# Patient Record
Sex: FEMALE | Race: WHITE | NOT HISPANIC OR LATINO
[De-identification: names, ages, dates, MRNs, and addresses within clinical notes are randomized per-mention and may not be internally consistent; named-entity substitution may affect disease eponyms.]

---

## 2019-08-20 ENCOUNTER — FORM ENCOUNTER (OUTPATIENT)
Age: 59
End: 2019-08-20

## 2019-09-16 ENCOUNTER — FORM ENCOUNTER (OUTPATIENT)
Age: 59
End: 2019-09-16

## 2020-02-06 ENCOUNTER — TRANSCRIPTION ENCOUNTER (OUTPATIENT)
Age: 60
End: 2020-02-06

## 2020-06-04 ENCOUNTER — TRANSCRIPTION ENCOUNTER (OUTPATIENT)
Age: 60
End: 2020-06-04

## 2020-08-24 ENCOUNTER — FORM ENCOUNTER (OUTPATIENT)
Age: 60
End: 2020-08-24

## 2020-09-14 ENCOUNTER — FORM ENCOUNTER (OUTPATIENT)
Age: 60
End: 2020-09-14

## 2021-08-23 PROBLEM — Z00.00 ENCOUNTER FOR PREVENTIVE HEALTH EXAMINATION: Status: ACTIVE | Noted: 2021-08-23

## 2021-08-31 DIAGNOSIS — Z87.19 PERSONAL HISTORY OF OTHER DISEASES OF THE DIGESTIVE SYSTEM: ICD-10-CM

## 2021-08-31 DIAGNOSIS — Z87.891 PERSONAL HISTORY OF NICOTINE DEPENDENCE: ICD-10-CM

## 2021-08-31 DIAGNOSIS — Z86.39 PERSONAL HISTORY OF OTHER ENDOCRINE, NUTRITIONAL AND METABOLIC DISEASE: ICD-10-CM

## 2021-08-31 DIAGNOSIS — Z78.9 OTHER SPECIFIED HEALTH STATUS: ICD-10-CM

## 2021-09-01 ENCOUNTER — APPOINTMENT (OUTPATIENT)
Dept: BREAST CENTER | Facility: CLINIC | Age: 61
End: 2021-09-01
Payer: COMMERCIAL

## 2021-09-01 VITALS — HEIGHT: 61 IN | WEIGHT: 150 LBS | BODY MASS INDEX: 28.32 KG/M2

## 2021-09-01 DIAGNOSIS — Z80.6 FAMILY HISTORY OF LEUKEMIA: ICD-10-CM

## 2021-09-01 DIAGNOSIS — M72.2 PLANTAR FASCIAL FIBROMATOSIS: ICD-10-CM

## 2021-09-01 PROCEDURE — 99214 OFFICE O/P EST MOD 30 MIN: CPT

## 2021-09-01 RX ORDER — ATORVASTATIN CALCIUM 10 MG/1
10 TABLET, FILM COATED ORAL
Refills: 0 | Status: ACTIVE | COMMUNITY

## 2021-09-01 RX ORDER — CALCIUM CARBONATE 600 MG
TABLET ORAL
Refills: 0 | Status: ACTIVE | COMMUNITY

## 2021-09-01 RX ORDER — PANTOPRAZOLE 40 MG/1
40 TABLET, DELAYED RELEASE ORAL
Refills: 0 | Status: ACTIVE | COMMUNITY

## 2021-09-01 RX ORDER — ROSUVASTATIN CALCIUM 5 MG/1
5 TABLET, FILM COATED ORAL
Refills: 0 | Status: DISCONTINUED | COMMUNITY
End: 2021-09-01

## 2021-09-01 NOTE — PHYSICAL EXAM
[Normocephalic] : normocephalic [Atraumatic] : atraumatic [EOMI] : extra ocular movement intact [Supple] : supple [No Supraclavicular Adenopathy] : no supraclavicular adenopathy [No Cervical Adenopathy] : no cervical adenopathy [Examined in the supine and seated position] : examined in the supine and seated position [No dominant masses] : no dominant masses in right breast  [No dominant masses] : no dominant masses left breast [No Nipple Retraction] : no left nipple retraction [No Nipple Discharge] : no left nipple discharge [Breast Mass Right Breast ___cm] : no masses [Breast Mass Left Breast ___cm] : no masses [Breast Nipple Inversion] : nipples not inverted [Breast Nipple Retraction] : nipples not retracted [Breast Nipple Flattening] : nipples not flattened [Breast Nipple Fissures] : nipples not fissured [Breast Abnormal Lactation (Galactorrhea)] : no galactorrhea [Breast Abnormal Secretion Serous Fluid] : no serous discharge [Breast Abnormal Secretion Bloody Fluid] : no bloody discharge [Breast Abnormal Secretion Opalescent Fluid] : no milky discharge [No Axillary Lymphadenopathy] : no left axillary lymphadenopathy [No Edema] : no edema [No Rashes] : no rashes [No Ulceration] : no ulceration [de-identified] : On exam, the patient has C-cup breasts.  On palpation, I cannot feel any suspicious densities in either breast.  She has no axillary, supraclavicular, or cervical adenopathy.

## 2021-09-01 NOTE — HISTORY OF PRESENT ILLNESS
[FreeTextEntry1] : The patient is a 61-year-old G2, P2 postmenopausal white female with Hong Konger and some Polish heritage.  She underwent menarche at age 14 and had her first child at age 29.  She underwent menopause at age 52 and never took any hormone replacement therapy.  She is a previous history of 2 right breast biopsies in her 20s which were benign.  She has no family history of breast or ovarian cancer.  The patient was seen in 2014 with a questionable left breast mass which turned out to be fibrocystic change and diagnostic studies were negative.  She comes in for routine yearly follow-up with a history of fibrocystic mastopathy and gets yearly mammography and ultrasound.

## 2021-09-01 NOTE — REASON FOR VISIT
[Follow-Up: _____] : a [unfilled] follow-up visit [FreeTextEntry1] : The patient comes in with a history of fibrocystic mastopathy for yearly clinical exams and gets yearly mammography and ultrasound.

## 2021-09-01 NOTE — ASSESSMENT
[FreeTextEntry1] : The patient is a 61-year-old G2, P2 postmenopausal white female with Angolan and some Polish heritage.  She underwent menarche at age 14 and had her first child at age 29.  She underwent menopause at age 52 and never took any hormone replacement therapy.  She is a previous history of 2 right breast biopsies in her 20s which were benign.  She has no family history of breast or ovarian cancer.  The patient was seen in 2014 with a questionable left breast mass which turned out to be fibrocystic change and diagnostic studies were negative.  The patient underwent her last mammography and ultrasound on August 26, 2021 at [place] which showed no suspicious findings.  On exam today, I cannot feel any suspicious densities in either breast.  The patient was reassured and should follow-up again in 1 year and is given a prescription for her next bilateral mammography and ultrasound again in August 2022.

## 2022-08-29 ENCOUNTER — RESULT REVIEW (OUTPATIENT)
Age: 62
End: 2022-08-29

## 2022-08-29 ENCOUNTER — APPOINTMENT (OUTPATIENT)
Dept: BREAST CENTER | Facility: CLINIC | Age: 62
End: 2022-08-29

## 2022-08-29 VITALS
WEIGHT: 142 LBS | OXYGEN SATURATION: 99 % | HEIGHT: 61 IN | HEART RATE: 85 BPM | SYSTOLIC BLOOD PRESSURE: 132 MMHG | DIASTOLIC BLOOD PRESSURE: 86 MMHG | BODY MASS INDEX: 26.81 KG/M2

## 2022-08-29 PROCEDURE — 99213 OFFICE O/P EST LOW 20 MIN: CPT

## 2022-08-29 NOTE — ASSESSMENT
[FreeTextEntry1] : The patient is a 62-year-old G2, P2 postmenopausal white female with Singaporean and some Polish heritage.  She underwent menarche at age 14 and had her first child at age 29.  She underwent menopause at age 52 and never took any hormone replacement therapy.  She is a previous history of 2 right breast biopsies in her 20s which were benign.  She has no family history of breast or ovarian cancer.  The patient was seen in 2014 with a questionable left breast mass which turned out to be fibrocystic change and diagnostic studies were negative.  The patient underwent her last mammography and ultrasound which was reviewed from today on August 29, 2022 performed here at [place] which showed no suspicious findings.  On exam today, I cannot feel any suspicious densities in either breast.  The patient was reassured and should follow-up again in 1 year and was given a prescription for her next bilateral mammography and ultrasound due in August 2023.

## 2022-08-29 NOTE — HISTORY OF PRESENT ILLNESS
[FreeTextEntry1] : The patient is a 62-year-old G2, P2 postmenopausal white female with Marshallese and some Polish heritage.  She underwent menarche at age 14 and had her first child at age 29.  She underwent menopause at age 52 and never took any hormone replacement therapy.  She is a previous history of 2 right breast biopsies in her 20s which were benign.  She has no family history of breast or ovarian cancer.  The patient was seen in 2014 with a questionable left breast mass which turned out to be fibrocystic change and diagnostic studies were negative.  She comes in for routine yearly follow-up with a history of fibrocystic mastopathy and gets yearly mammography and ultrasound.

## 2022-08-29 NOTE — PHYSICAL EXAM
[Normocephalic] : normocephalic [Atraumatic] : atraumatic [EOMI] : extra ocular movement intact [Supple] : supple [No Supraclavicular Adenopathy] : no supraclavicular adenopathy [No Cervical Adenopathy] : no cervical adenopathy [Examined in the supine and seated position] : examined in the supine and seated position [No dominant masses] : no dominant masses in right breast  [No dominant masses] : no dominant masses left breast [No Nipple Retraction] : no left nipple retraction [No Nipple Discharge] : no left nipple discharge [Breast Mass Right Breast ___cm] : no masses [Breast Mass Left Breast ___cm] : no masses [No Axillary Lymphadenopathy] : no left axillary lymphadenopathy [No Edema] : no edema [No Rashes] : no rashes [No Ulceration] : no ulceration [Breast Nipple Inversion] : nipples not inverted [Breast Nipple Retraction] : nipples not retracted [Breast Nipple Flattening] : nipples not flattened [Breast Nipple Fissures] : nipples not fissured [Breast Abnormal Lactation (Galactorrhea)] : no galactorrhea [Breast Abnormal Secretion Bloody Fluid] : no bloody discharge [Breast Abnormal Secretion Serous Fluid] : no serous discharge [Breast Abnormal Secretion Opalescent Fluid] : no milky discharge [de-identified] : On exam, the patient has C-cup breasts.  On palpation, I cannot feel any suspicious densities in either breast.  She has no axillary, supraclavicular, or cervical adenopathy.

## 2023-08-30 ENCOUNTER — RESULT REVIEW (OUTPATIENT)
Age: 63
End: 2023-08-30

## 2023-08-30 ENCOUNTER — APPOINTMENT (OUTPATIENT)
Dept: BREAST CENTER | Facility: CLINIC | Age: 63
End: 2023-08-30
Payer: COMMERCIAL

## 2023-08-30 VITALS — BODY MASS INDEX: 26.81 KG/M2 | WEIGHT: 142 LBS | HEIGHT: 61 IN

## 2023-08-30 DIAGNOSIS — R92.2 INCONCLUSIVE MAMMOGRAM: ICD-10-CM

## 2023-08-30 DIAGNOSIS — N60.12 DIFFUSE CYSTIC MASTOPATHY OF LEFT BREAST: ICD-10-CM

## 2023-08-30 DIAGNOSIS — N60.11 DIFFUSE CYSTIC MASTOPATHY OF LEFT BREAST: ICD-10-CM

## 2023-08-30 DIAGNOSIS — D24.2 BENIGN NEOPLASM OF LEFT BREAST: ICD-10-CM

## 2023-08-30 PROCEDURE — 99213 OFFICE O/P EST LOW 20 MIN: CPT

## 2023-08-30 NOTE — ASSESSMENT
[FreeTextEntry1] : The patient is a 63-year-old G2, P2 postmenopausal white female with Vincentian and some Polish heritage.  She underwent menarche at age 14 and had her first child at age 29.  She underwent menopause at age 52 and never took any hormone replacement therapy.  She has a previous history of 2 right breast biopsies in her 20s which were benign.  She has no family history of breast or ovarian cancer.  The patient was seen in 2014 with a questionable left breast mass which turned out to be fibrocystic change and diagnostic studies were negative.  She did undergo a benign left 11:00 breast needle core biopsy in August 2018 which was benign.  The patient underwent her last mammography and ultrasound which was reviewed from today on August 30, 2022 performed here at [facility] which showed no suspicious findings.  On exam today, I cannot feel any suspicious densities in either breast.  The patient was reassured and should follow-up again in 1 year and was given a prescription for her next bilateral mammography and ultrasound due in August 2024.

## 2023-08-30 NOTE — PHYSICAL EXAM
[Normocephalic] : normocephalic [Atraumatic] : atraumatic [EOMI] : extra ocular movement intact [Supple] : supple [No Cervical Adenopathy] : no cervical adenopathy [No Supraclavicular Adenopathy] : no supraclavicular adenopathy [Examined in the supine and seated position] : examined in the supine and seated position [No dominant masses] : no dominant masses in right breast  [No dominant masses] : no dominant masses left breast [No Nipple Retraction] : no left nipple retraction [No Nipple Discharge] : no left nipple discharge [Breast Mass Right Breast ___cm] : no masses [Breast Mass Left Breast ___cm] : no masses [No Axillary Lymphadenopathy] : no left axillary lymphadenopathy [No Edema] : no edema [No Rashes] : no rashes [No Ulceration] : no ulceration [Breast Nipple Inversion] : nipples not inverted [Breast Nipple Retraction] : nipples not retracted [Breast Nipple Flattening] : nipples not flattened [Breast Nipple Fissures] : nipples not fissured [Breast Abnormal Lactation (Galactorrhea)] : no galactorrhea [Breast Abnormal Secretion Bloody Fluid] : no bloody discharge [Breast Abnormal Secretion Serous Fluid] : no serous discharge [Breast Abnormal Secretion Opalescent Fluid] : no milky discharge [de-identified] : On exam, the patient has C-cup breasts.  On palpation, I cannot feel any suspicious densities in either breast.  She has no axillary, supraclavicular, or cervical adenopathy.

## 2023-08-30 NOTE — HISTORY OF PRESENT ILLNESS
[FreeTextEntry1] : The patient is a 63-year-old G2, P2 postmenopausal white female with American and some Polish heritage.  She underwent menarche at age 14 and had her first child at age 29.  She underwent menopause at age 52 and never took any hormone replacement therapy.  She has a previous history of 2 right breast biopsies in her 20s which were benign.  She has no family history of breast or ovarian cancer.  The patient was seen in 2014 with a questionable left breast mass which turned out to be fibrocystic change and diagnostic studies were negative.  She comes in for routine yearly follow-up with a history of fibrocystic mastopathy and gets yearly mammography and ultrasound.

## 2024-09-16 ENCOUNTER — APPOINTMENT (OUTPATIENT)
Dept: BREAST CENTER | Facility: CLINIC | Age: 64
End: 2024-09-16
Payer: COMMERCIAL

## 2024-09-16 ENCOUNTER — RESULT REVIEW (OUTPATIENT)
Age: 64
End: 2024-09-16

## 2024-09-16 VITALS — WEIGHT: 141 LBS | BODY MASS INDEX: 26.62 KG/M2 | HEIGHT: 61 IN

## 2024-09-16 DIAGNOSIS — N60.11 DIFFUSE CYSTIC MASTOPATHY OF LEFT BREAST: ICD-10-CM

## 2024-09-16 DIAGNOSIS — Z12.31 ENCOUNTER FOR SCREENING MAMMOGRAM FOR MALIGNANT NEOPLASM OF BREAST: ICD-10-CM

## 2024-09-16 DIAGNOSIS — N60.12 DIFFUSE CYSTIC MASTOPATHY OF LEFT BREAST: ICD-10-CM

## 2024-09-16 DIAGNOSIS — D24.2 BENIGN NEOPLASM OF LEFT BREAST: ICD-10-CM

## 2024-09-16 PROCEDURE — 99212 OFFICE O/P EST SF 10 MIN: CPT

## 2024-09-16 NOTE — ASSESSMENT
[FreeTextEntry1] : The patient is a 64-year-old G2, P2 postmenopausal white female with German and some Polish heritage.  She underwent menarche at age 14 and had her first child at age 29.  She underwent menopause at age 52 and never took any hormone replacement therapy.  She has a previous history of 2 right breast biopsies in her 20s which were benign.  She has no family history of breast or ovarian cancer.  The patient was seen in 2014 with a questionable left breast mass which turned out to be fibrocystic change and diagnostic studies were negative.  She did undergo a benign left 11:00 breast needle core biopsy in August 2018 which was benign.  The patient underwent her last mammography and ultrasound which was reviewed from today on September 16, 2024 performed here at [place] which showed no suspicious findings.  On exam today, I cannot feel any suspicious densities in either breast.  The patient was reassured and should follow-up again in 1 year and was given a prescription for her next bilateral mammography and ultrasound due in September 2025.

## 2024-09-16 NOTE — HISTORY OF PRESENT ILLNESS
[FreeTextEntry1] : The patient is a 64-year-old G2, P2 postmenopausal white female with Lao and some Polish heritage.  She underwent menarche at age 14 and had her first child at age 29.  She underwent menopause at age 52 and never took any hormone replacement therapy.  She has a previous history of 2 right breast biopsies in her 20s which were benign.  She has no family history of breast or ovarian cancer.  The patient was seen in 2014 with a questionable left breast mass which turned out to be fibrocystic change and diagnostic studies were negative.  She comes in for routine yearly follow-up with a history of fibrocystic mastopathy and gets yearly mammography and ultrasound.

## 2024-09-16 NOTE — PHYSICAL EXAM
[Normocephalic] : normocephalic [Atraumatic] : atraumatic [EOMI] : extra ocular movement intact [Supple] : supple [No Supraclavicular Adenopathy] : no supraclavicular adenopathy [No Cervical Adenopathy] : no cervical adenopathy [Examined in the supine and seated position] : examined in the supine and seated position [No dominant masses] : no dominant masses in right breast  [No dominant masses] : no dominant masses left breast [No Nipple Retraction] : no left nipple retraction [No Nipple Discharge] : no left nipple discharge [Breast Mass Right Breast ___cm] : no masses [Breast Mass Left Breast ___cm] : no masses [No Axillary Lymphadenopathy] : no left axillary lymphadenopathy [No Edema] : no edema [No Rashes] : no rashes [No Ulceration] : no ulceration [Breast Nipple Inversion] : nipples not inverted [Breast Nipple Retraction] : nipples not retracted [Breast Nipple Flattening] : nipples not flattened [Breast Nipple Fissures] : nipples not fissured [Breast Abnormal Lactation (Galactorrhea)] : no galactorrhea [Breast Abnormal Secretion Bloody Fluid] : no bloody discharge [Breast Abnormal Secretion Opalescent Fluid] : no milky discharge [Breast Abnormal Secretion Serous Fluid] : no serous discharge [de-identified] : On exam, the patient has C-cup breasts.  On palpation, I cannot feel any suspicious densities in either breast.  She has no axillary, supraclavicular, or cervical adenopathy.

## 2024-09-16 NOTE — ASSESSMENT
[FreeTextEntry1] : The patient is a 64-year-old G2, P2 postmenopausal white female with Slovenian and some Polish heritage.  She underwent menarche at age 14 and had her first child at age 29.  She underwent menopause at age 52 and never took any hormone replacement therapy.  She has a previous history of 2 right breast biopsies in her 20s which were benign.  She has no family history of breast or ovarian cancer.  The patient was seen in 2014 with a questionable left breast mass which turned out to be fibrocystic change and diagnostic studies were negative.  She did undergo a benign left 11:00 breast needle core biopsy in August 2018 which was benign.  The patient underwent her last mammography and ultrasound which was reviewed from today on September 16, 2024 performed here at [place] which showed no suspicious findings.  On exam today, I cannot feel any suspicious densities in either breast.  The patient was reassured and should follow-up again in 1 year and was given a prescription for her next bilateral mammography and ultrasound due in September 2025.

## 2024-09-16 NOTE — PAST MEDICAL HISTORY
Birth Control Pills Counseling: Birth Control Pill Counseling: I discussed with the patient the potential side effects of OCPs including but not limited to increased risk of stroke, heart attack, thrombophlebitis, deep venous thrombosis, hepatic adenomas, breast changes, GI upset, headaches, and depression.  The patient verbalized understanding of the proper use and possible adverse effects of OCPs. All of the patient's questions and concerns were addressed. Spoke with Hrei UREÑA at [facility] called report informed her of the  time scheduled at 19:30. Expecting patient to return to the unit at any moment spoke to Dialysis patient's vs are stable . He is to be discharged this evening.   Topical Retinoid Pregnancy And Lactation Text: This medication is Pregnancy Category C. It is unknown if this medication is excreted in breast milk. [Postmenopausal] : The patient is postmenopausal Isotretinoin Pregnancy And Lactation Text: This medication is Pregnancy Category X and is considered extremely dangerous during pregnancy. It is unknown if it is excreted in breast milk. [Menarche Age ____] : age at menarche was [unfilled] Topical Clindamycin Pregnancy And Lactation Text: This medication is Pregnancy Category B and is considered safe during pregnancy. It is unknown if it is excreted in breast milk. [Menopause Age____] : age at menopause was [unfilled] Minocycline Pregnancy And Lactation Text: This medication is Pregnancy Category D and not consider safe during pregnancy. It is also excreted in breast milk. [Total Preg ___] : G[unfilled] Doxycycline Counseling:  Patient counseled regarding possible photosensitivity and increased risk for sunburn.  Patient instructed to avoid sunlight, if possible.  When exposed to sunlight, patients should wear protective clothing, sunglasses, and sunscreen.  The patient was instructed to call the office immediately if the following severe adverse effects occur:  hearing changes, easy bruising/bleeding, severe headache, or vision changes.  The patient verbalized understanding of the proper use and possible adverse effects of doxycycline.  All of the patient's questions and concerns were addressed. [Live Births ___] : P[unfilled]  [Age At Live Birth ___] : Age at live birth: [unfilled] Azelaic Acid Counseling: Patient counseled that medicine may cause skin irritation and to avoid applying near the eyes.  In the event of skin irritation, the patient was advised to reduce the amount of the drug applied or use it less frequently.   The patient verbalized understanding of the proper use and possible adverse effects of azelaic acid.  All of the patient's questions and concerns were addressed. [History of Hormone Replacement Treatment] : has no history of hormone replacement treatment Spironolactone Pregnancy And Lactation Text: This medication can cause feminization of the male fetus and should be avoided during pregnancy. The active metabolite is also found in breast milk. Include Pregnancy/Lactation Warning?: No Topical Retinoid counseling:  Patient advised to apply a pea-sized amount only at bedtime and wait 30 minutes after washing their face before applying.  If too drying, patient may add a non-comedogenic moisturizer. The patient verbalized understanding of the proper use and possible adverse effects of retinoids.  All of the patient's questions and concerns were addressed. Isotretinoin Counseling: Patient should get monthly blood tests, not donate blood, not drive at night if vision affected, not share medication, and not undergo elective surgery for 6 months after tx completed. Side effects reviewed, pt to contact office should one occur. Winlevi Pregnancy And Lactation Text: This medication is considered safe during pregnancy and breastfeeding. Bactrim Pregnancy And Lactation Text: This medication is Pregnancy Category D and is known to cause fetal risk.  It is also excreted in breast milk. Topical Clindamycin Counseling: Patient counseled that this medication may cause skin irritation or allergic reactions.  In the event of skin irritation, the patient was advised to reduce the amount of the drug applied or use it less frequently.   The patient verbalized understanding of the proper use and possible adverse effects of clindamycin.  All of the patient's questions and concerns were addressed. Detail Level: Zone Minocycline Counseling: Patient advised regarding possible photosensitivity and discoloration of the teeth, skin, lips, tongue and gums.  Patient instructed to avoid sunlight, if possible.  When exposed to sunlight, patients should wear protective clothing, sunglasses, and sunscreen.  The patient was instructed to call the office immediately if the following severe adverse effects occur:  hearing changes, easy bruising/bleeding, severe headache, or vision changes.  The patient verbalized understanding of the proper use and possible adverse effects of minocycline.  All of the patient's questions and concerns were addressed. Aklief Pregnancy And Lactation Text: It is unknown if this medication is safe to use during pregnancy.  It is unknown if this medication is excreted in breast milk.  Breastfeeding women should use the topical cream on the smallest area of the skin for the shortest time needed while breastfeeding.  Do not apply to nipple and areola. Dapsone Pregnancy And Lactation Text: This medication is Pregnancy Category C and is not considered safe during pregnancy or breast feeding. Erythromycin Pregnancy And Lactation Text: This medication is Pregnancy Category B and is considered safe during pregnancy. It is also excreted in breast milk. Benzoyl Peroxide Pregnancy And Lactation Text: This medication is Pregnancy Category C. It is unknown if benzoyl peroxide is excreted in breast milk. Winlevi Counseling:  I discussed with the patient the risks of topical clascoterone including but not limited to erythema, scaling, itching, and stinging. Patient voiced their understanding. Spironolactone Counseling: Patient advised regarding risks of diarrhea, abdominal pain, hyperkalemia, birth defects (for female patients), liver toxicity and renal toxicity. The patient may need blood work to monitor liver and kidney function and potassium levels while on therapy. The patient verbalized understanding of the proper use and possible adverse effects of spironolactone.  All of the patient's questions and concerns were addressed. Bactrim Counseling:  I discussed with the patient the risks of sulfa antibiotics including but not limited to GI upset, allergic reaction, drug rash, diarrhea, dizziness, photosensitivity, and yeast infections.  Rarely, more serious reactions can occur including but not limited to aplastic anemia, agranulocytosis, methemoglobinemia, blood dyscrasias, liver or kidney failure, lung infiltrates or desquamative/blistering drug rashes. High Dose Vitamin A Pregnancy And Lactation Text: High dose vitamin A therapy is contraindicated during pregnancy and breast feeding. Aklief counseling:  Patient advised to apply a pea-sized amount only at bedtime and wait 30 minutes after washing their face before applying.  If too drying, patient may add a non-comedogenic moisturizer.  The most commonly reported side effects including irritation, redness, scaling, dryness, stinging, burning, itching, and increased risk of sunburn.  The patient verbalized understanding of the proper use and possible adverse effects of retinoids.  All of the patient's questions and concerns were addressed. Dapsone Counseling: I discussed with the patient the risks of dapsone including but not limited to hemolytic anemia, agranulocytosis, rashes, methemoglobinemia, kidney failure, peripheral neuropathy, headaches, GI upset, and liver toxicity.  Patients who start dapsone require monitoring including baseline LFTs and weekly CBCs for the first month, then every month thereafter.  The patient verbalized understanding of the proper use and possible adverse effects of dapsone.  All of the patient's questions and concerns were addressed. Tazorac Pregnancy And Lactation Text: This medication is not safe during pregnancy. It is unknown if this medication is excreted in breast milk. Benzoyl Peroxide Counseling: Patient counseled that medicine may cause skin irritation and bleach clothing.  In the event of skin irritation, the patient was advised to reduce the amount of the drug applied or use it less frequently.   The patient verbalized understanding of the proper use and possible adverse effects of benzoyl peroxide.  All of the patient's questions and concerns were addressed. Topical Sulfur Applications Pregnancy And Lactation Text: This medication is Pregnancy Category C and has an unknown safety profile during pregnancy. It is unknown if this topical medication is excreted in breast milk. Erythromycin Counseling:  I discussed with the patient the risks of erythromycin including but not limited to GI upset, allergic reaction, drug rash, diarrhea, increase in liver enzymes, and yeast infections. Azithromycin Pregnancy And Lactation Text: This medication is considered safe during pregnancy and is also secreted in breast milk. Birth Control Pills Pregnancy And Lactation Text: This medication should be avoided if pregnant and for the first 30 days post-partum. High Dose Vitamin A Counseling: Side effects reviewed, pt to contact office should one occur. Tazorac Counseling:  Patient advised that medication is irritating and drying.  Patient may need to apply sparingly and wash off after an hour before eventually leaving it on overnight.  The patient verbalized understanding of the proper use and possible adverse effects of tazorac.  All of the patient's questions and concerns were addressed. Topical Sulfur Applications Counseling: Topical Sulfur Counseling: Patient counseled that this medication may cause skin irritation or allergic reactions.  In the event of skin irritation, the patient was advised to reduce the amount of the drug applied or use it less frequently.   The patient verbalized understanding of the proper use and possible adverse effects of topical sulfur application.  All of the patient's questions and concerns were addressed. Azithromycin Counseling:  I discussed with the patient the risks of azithromycin including but not limited to GI upset, allergic reaction, drug rash, diarrhea, and yeast infections. Sarecycline Counseling: Patient advised regarding possible photosensitivity and discoloration of the teeth, skin, lips, tongue and gums.  Patient instructed to avoid sunlight, if possible.  When exposed to sunlight, patients should wear protective clothing, sunglasses, and sunscreen.  The patient was instructed to call the office immediately if the following severe adverse effects occur:  hearing changes, easy bruising/bleeding, severe headache, or vision changes.  The patient verbalized understanding of the proper use and possible adverse effects of sarecycline.  All of the patient's questions and concerns were addressed. Azelaic Acid Pregnancy And Lactation Text: This medication is considered safe during pregnancy and breast feeding. Doxycycline Pregnancy And Lactation Text: This medication is Pregnancy Category D and not consider safe during pregnancy. It is also excreted in breast milk but is considered safe for shorter treatment courses. Tetracycline Counseling: Patient counseled regarding possible photosensitivity and increased risk for sunburn.  Patient instructed to avoid sunlight, if possible.  When exposed to sunlight, patients should wear protective clothing, sunglasses, and sunscreen.  The patient was instructed to call the office immediately if the following severe adverse effects occur:  hearing changes, easy bruising/bleeding, severe headache, or vision changes.  The patient verbalized understanding of the proper use and possible adverse effects of tetracycline.  All of the patient's questions and concerns were addressed. Patient understands to avoid pregnancy while on therapy due to potential birth defects.

## 2024-09-16 NOTE — PHYSICAL EXAM
[Normocephalic] : normocephalic [Atraumatic] : atraumatic [EOMI] : extra ocular movement intact [Supple] : supple [No Supraclavicular Adenopathy] : no supraclavicular adenopathy [No Cervical Adenopathy] : no cervical adenopathy [Examined in the supine and seated position] : examined in the supine and seated position [No dominant masses] : no dominant masses in right breast  [No dominant masses] : no dominant masses left breast [No Nipple Retraction] : no left nipple retraction [No Nipple Discharge] : no left nipple discharge [Breast Mass Right Breast ___cm] : no masses [Breast Mass Left Breast ___cm] : no masses [No Axillary Lymphadenopathy] : no left axillary lymphadenopathy [No Edema] : no edema [No Rashes] : no rashes [No Ulceration] : no ulceration [Breast Nipple Inversion] : nipples not inverted [Breast Nipple Retraction] : nipples not retracted [Breast Nipple Flattening] : nipples not flattened [Breast Nipple Fissures] : nipples not fissured [Breast Abnormal Lactation (Galactorrhea)] : no galactorrhea [Breast Abnormal Secretion Bloody Fluid] : no bloody discharge [Breast Abnormal Secretion Opalescent Fluid] : no milky discharge [Breast Abnormal Secretion Serous Fluid] : no serous discharge [de-identified] : On exam, the patient has C-cup breasts.  On palpation, I cannot feel any suspicious densities in either breast.  She has no axillary, supraclavicular, or cervical adenopathy.

## 2024-09-16 NOTE — HISTORY OF PRESENT ILLNESS
[FreeTextEntry1] : The patient is a 64-year-old G2, P2 postmenopausal white female with [place] and some [place] heritage.  She underwent menarche at age 14 and had her first child at age 29.  She underwent menopause at age 52 and never took any hormone replacement therapy.  She has a previous history of 2 right breast biopsies in her 20s which were benign.  She has no family history of breast or ovarian cancer.  The patient was seen in 2014 with a questionable left breast mass which turned out to be fibrocystic change and diagnostic studies were negative.  She comes in for routine yearly follow-up with a history of fibrocystic mastopathy and gets yearly mammography and ultrasound.

## 2025-08-13 ENCOUNTER — NON-APPOINTMENT (OUTPATIENT)
Age: 65
End: 2025-08-13